# Patient Record
Sex: FEMALE | Race: WHITE | Employment: FULL TIME | URBAN - METROPOLITAN AREA
[De-identification: names, ages, dates, MRNs, and addresses within clinical notes are randomized per-mention and may not be internally consistent; named-entity substitution may affect disease eponyms.]

---

## 2019-07-01 ENCOUNTER — APPOINTMENT (OUTPATIENT)
Dept: GENERAL RADIOLOGY | Facility: CLINIC | Age: 24
End: 2019-07-01
Attending: EMERGENCY MEDICINE
Payer: COMMERCIAL

## 2019-07-01 ENCOUNTER — HOSPITAL ENCOUNTER (EMERGENCY)
Facility: CLINIC | Age: 24
Discharge: HOME | End: 2019-07-01
Attending: EMERGENCY MEDICINE | Admitting: EMERGENCY MEDICINE
Payer: COMMERCIAL

## 2019-07-01 VITALS
DIASTOLIC BLOOD PRESSURE: 89 MMHG | RESPIRATION RATE: 18 BRPM | SYSTOLIC BLOOD PRESSURE: 136 MMHG | BODY MASS INDEX: 31.39 KG/M2 | WEIGHT: 200 LBS | HEART RATE: 98 BPM | OXYGEN SATURATION: 98 % | HEIGHT: 67 IN | TEMPERATURE: 98.1 F

## 2019-07-01 DIAGNOSIS — S93.402A SPRAIN OF LEFT ANKLE, UNSPECIFIED LIGAMENT, INITIAL ENCOUNTER: ICD-10-CM

## 2019-07-01 PROCEDURE — 73610 X-RAY EXAM OF ANKLE: CPT | Mod: LT

## 2019-07-01 PROCEDURE — 99283 EMERGENCY DEPT VISIT LOW MDM: CPT

## 2019-07-01 PROCEDURE — 25000132 ZZH RX MED GY IP 250 OP 250 PS 637: Performed by: EMERGENCY MEDICINE

## 2019-07-01 RX ORDER — IBUPROFEN 600 MG/1
600 TABLET, FILM COATED ORAL ONCE
Status: COMPLETED | OUTPATIENT
Start: 2019-07-01 | End: 2019-07-01

## 2019-07-01 RX ADMIN — IBUPROFEN 600 MG: 600 TABLET ORAL at 09:06

## 2019-07-01 SDOH — HEALTH STABILITY: MENTAL HEALTH: HOW OFTEN DO YOU HAVE A DRINK CONTAINING ALCOHOL?: NEVER

## 2019-07-01 ASSESSMENT — ENCOUNTER SYMPTOMS: ARTHRALGIAS: 1

## 2019-07-01 ASSESSMENT — MIFFLIN-ST. JEOR: SCORE: 1694.82

## 2019-07-01 NOTE — ED PROVIDER NOTES
"  History     Chief Complaint:  Ankle Pain    HPI   Suzie Hill is a 23 year old female who presents with left ankle pain. The patient states that she was at [employer] yesterday, and fell on her ankle. She states that the ankle inverted and she has not been able to walk on it since. The patient denies pain to rest of her leg including her heel.     Allergies:  Amoxicillin  Penicillin    Medications:    Medications reviewed. No current medications.     Past Medical History:    Medical history reviewed. No pertinent medical history.    Past Surgical History:    Surgical history reviewed. No pertinent surgical history.    Family History:    Family history reviewed. No pertinent family history.     Social History:  Smoking Status: Never Smoker  Smokeless Tobacco: Never Used  Alcohol Use: no     Review of Systems   Musculoskeletal: Positive for arthralgias.   All other systems reviewed and are negative.      Physical Exam     Patient Vitals for the past 24 hrs:   BP Temp Temp src Pulse Resp SpO2 Height Weight   07/01/19 0850 136/89 98.1  F (36.7  C) Oral 98 18 98 % 1.702 m (5' 7\") 90.7 kg (200 lb)         Physical Exam  Constitutional: Alert, attentive, GCS 15  CV: 2+ DP and PT pulses, brisk distal cap refill  MSK: moderate swelling to the left ankle, bilateral malleolus tenderness   No tenderness to the midfoot, calcaneus, base of 5th metatarsal or proximal tib/fib  Neurological: 5/5 strength to the DF, PF, EHL and FHL motor functions; sensation intact to the DP, SP, T, S and S distributions  Skin: Skin is warm and dry.      Emergency Department Course     Imaging:  Radiology findings were communicated with the patient who voiced understanding of the findings.    XR ankle left G/E:  There is diffuse soft tissue swelling around the ankle. No  fracture or other osseous abnormality is seen. The joint spaces are  well preserved.   Reading per radiology    Interventions:  0906 Advil 600 mg Oral    Emergency Department " Course:     Nursing notes and vitals reviewed.    0926 The patient was sent for a XR ankle while in the emergency department, results above.     0943 I performed an exam of the patient as documented above. I personally reviewed the imaging results with the patient and answered all related questions prior to discharge.    Impression & Plan      Medical Decision Making:  This is a very pleasant 23 year old female who presented with a left ankle injury after jumping on a trampoline consistent with sprain, with no evidence of fracture on x-ray. There is no proximal tenderness or pain to suggest tib/fib or knee injury. There is no midfoot, calcaneus or base of 5th MT tenderness to suggest midfoot or calcaneus injury.  The patient was placed in an air splint and given norco with good pain relief. I have advised ibuprofen, ice, rest and elevation. The patient is only ambulatory with crutches, and will be given an air splint for eventual touch down weight bearing.  I advised strict return precautions for worse pain, swelling, numbness, or any other concerning symptoms, as well as follow-up with primary care in 3-5 days.        Diagnosis:    ICD-10-CM    1. Sprain of left ankle, unspecified ligament, initial encounter S93.402A        Disposition:   The patient is discharged to home.    Scribe Disclosure:  I, Flora Luna, am serving as a scribe at 9:44 AM on 7/1/2019 to document services personally performed by Rodrigo Anguiano MD based on my observations and the provider's statements to me.[facility] EMERGENCY DEPARTMENT       Rodrigo Anguiano MD  07/01/19 7509

## 2019-07-01 NOTE — ED TRIAGE NOTES
Pt was at [employer] last night, injuried right ankle. Unable to bear weight. Has an ace wrap on for support. Used ice last night and has taken aleve without relief.

## 2019-07-01 NOTE — ED NOTES
Pt ambulated in hallway with crutches and ankle brace independently. Gait steady. Pt felt comfortable ambulating with crutches. MD notified.

## 2019-07-01 NOTE — ED AVS SNAPSHOT
[facility] Emergency Department  [address]  [facility] [address]  Phone:  [phone]  Fax:  [phone]                                    Suzie Hill   MRN: [id number]    Department:  [facility] Emergency Department   Date of Visit:  7/1/2019           After Visit Summary Signature Page    I have received my discharge instructions, and my questions have been answered. I have discussed any challenges I see with this plan with the nurse or doctor.    ..........................................................................................................................................  Patient/Patient Representative Signature      ..........................................................................................................................................  Patient Representative Print Name and Relationship to Patient    ..................................................               ................................................  Date                                   Time    ..........................................................................................................................................  Reviewed by Signature/Title    ...................................................              ..............................................  Date                                               Time          22EPIC Rev 08/18

## 2019-09-02 ENCOUNTER — VIRTUAL VISIT (OUTPATIENT)
Dept: FAMILY MEDICINE | Facility: OTHER | Age: 24
End: 2019-09-02

## 2019-09-02 NOTE — PROGRESS NOTES
"Date:   Clinician: Ce Hernandes  Clinician NPI: [id number]  Patient: Suzie Hill  Patient : 1995  Patient Address: [address] [place] [address] [address], [place] [id number]  Patient Phone: [phone]  Visit Protocol: UTI  Patient Summary:  Suzie is a 23 year old ( : 1995 ) female who initiated a Visit for a presumed bladder infection. When asked the question \"Please sign me up to receive news, health information and promotions from [email or web address].\", Suzie responded \"No\".   Her symptoms started 1-3 days ago and consist of urinary frequency, urgency, urinary incontinence, vaginal itching, dysuria, foul-smelling urine, and feeling as if the bladder is never empty.   Symptom details   Urine color: The color of her urine is yellow.    Denied symptoms include chills, vaginal discharge, vomiting, nausea, flank pain, and abdominal pain. She does not feel feverish.   Over-the-counter medications or home remedies used to relieve the current symptoms as reported by the patient (free text): Azo   Precipitating events  Suzie denies having a sexually transmitted disease.  Pertinent medical history  Suzie has had a bladder infection before and has had 1 in the past 12 months. Her most recent bladder infection was not within the last 4 weeks. Her current symptoms are similar to her previous bladder infection symptoms.   She is not sure what antibiotics have been effective in treating her past bladder infections.   Suzie has not been prescribed antibiotics to prevent frequent or repeated bladder infections in the past and does not get yeast infections when she takes antibiotics. She has not experienced problems or side effects with any of the common antibiotics used to treat bladder infections.   Suzie does not have a history of kidney stones. She has not used a catheter or been a patient in a hospital or nursing home in the past 2 weeks.   Suzie does not smoke or use smokeless tobacco.   She denies pregnancy " and denies breastfeeding. She has menstruated in the past month.   Additional information as reported by the patient (free text): Last year when I went in for my bladder infection they said that I have bacterial vaginosis which will make me have bladder infections often. I haven't had one since last year though.     MEDICATIONS: No current medications, ALLERGIES: NKDA  Clinician Response:  Dear Suzie,  Based on the information you have provided, you likely have an acute urinary tract infection, also called a bladder infection. Bladder infections occur when bacteria from the outside of the body enters the urinary tract. Any part of the urinary system can be infected, but the bladder is the most common.  Medication information  I am prescribing:     Nitrofurantoin monohyd/m-cryst (Macrobid) 100 mg oral capsule. Take 1 capsule by mouth every 12 hours for 5 days. Take this medication with food. There are no refills with this prescription.   The medication I prescribed for your bladder infection is an antibiotic. Continue taking the medication until it is gone even if you feel better.   Yeast infections can be a common side effect of antibiotics. The most common symptom of a yeast infection is itchiness in and around the vagina. Other signs and symptoms include burning, redness, or a thick, white vaginal discharge that looks like cottage cheese and does not have a bad smell.  Self care  Urination helps to flush bacteria from the urinary tract. For this reason, drinking water and urinating often helps relieve some urinary symptoms and can decrease your risk of getting bladder infections in the future.  Other steps you can take to prevent future bladder infections include:     Wipe front to back after using the bathroom    Urinate after sexual intercourse    Avoid using deodorant sprays, douches, or powders in the vaginal area     When to seek care  Please make an appointment to be seen in a clinic or urgent care if any  of the following occur:     You develop new symptoms or your symptoms become worse    You have medication side effects that make it difficult to take them as prescribed    Your symptoms do not improve within 1-2 days of starting treatment    You have symptoms of a bladder infection that return shortly after completing treatment     It is possible to have an allergic reaction to an antibiotic even if you have not had one in the past. If you notice a new rash, significant swelling, or difficulty breathing, stop taking this medication immediately and go to a clinic or urgent care.   Diagnosis: Acute uncomplicated bladder infection  Diagnosis ICD: N39.0  Prescription: nitrofurantoin monohyd/m-cryst (Macrobid) 100 mg oral capsule 10 capsule, 5 days supply. Take 1 capsule by mouth every 12 hours for 5 days. Refills: 0, Refill as needed: no, Allow substitutions: yes  Pharmacy: [facility] #[id number] - [phone] - [address], [place] [address]

## 2019-12-12 ENCOUNTER — VIRTUAL VISIT (OUTPATIENT)
Dept: FAMILY MEDICINE | Facility: OTHER | Age: 24
End: 2019-12-12

## 2019-12-13 NOTE — PROGRESS NOTES
"Date: 2019 18:05:38  Clinician: Margie Obrien  Clinician NPI: [id number]  Patient: Suzie Hill  Patient : 1995  Patient Address: [address] [place] [address] [address], [place] [address]  Patient Phone: [phone]  Visit Protocol: URI  Patient Summary:  Suzie is a 23 year old ( : 1995 ) female who initiated a Visit for cold, sinus infection, or influenza. When asked the question \"Please sign me up to receive news, health information and promotions from [employer].\", Suzie responded \"No\".    Suzie states her symptoms started gradually 3-6 days ago. After her symptoms started, they improved and then got worse again.   Her symptoms consist of a headache, a sore throat, enlarged lymph nodes, nasal congestion, rhinitis, tooth pain, a cough, facial pain or pressure, and myalgia. She is experiencing mild difficulty breathing with activities but can speak normally in full sentences.   Symptom details     Nasal secretions: The color of her mucus is yellow.    Cough: Suzie coughs almost every minute and her cough is more bothersome at night. Phlegm comes into her throat when she coughs. She does not believe the phlegm causes the cough. The color of the phlegm is yellow.     Sore throat: Suzie reports having moderate throat pain (4-6 on a 10 point pain scale), does not have exudate on her tonsils, and can swallow liquids. The lymph nodes in her neck are enlarged. A rash has not appeared on the skin since the sore throat started.     Facial pain or pressure: The facial pain or pressure feels worse when bending over or leaning forward.     Headache: She states the headache is severe (7-9 on a 10 point pain scale).     Tooth pain: The tooth pain is not caused by a cavity, recent dental work, or other mouth problems.      Suzie denies having ear pain, chills, malaise, fever, and wheezing. She also denies having recent facial or sinus surgery in the past 60 days and having a sinus infection within the past " year.   Precipitating events  Within the past week, Suzie has not been exposed to someone with strep throat. She has not recently been exposed to someone with influenza. Suzie has not been in close contact with any high risk individuals.   Pertinent medical history  Suzie has taken an antibiotic medication in the past month. Antibiotic details as reported by the patient (free text): I came in with a tight chest and cough. They tested me for an ammonia but it was negative so they put me on an antibiotic. I seemed to get better. But then I feel like I got worse   Suzie does not get yeast infections when she takes antibiotics.   Weight: 230 lbs   Suzie does not smoke or use smokeless tobacco.   She denies pregnancy and denies breastfeeding. She has menstruated in the past month.   Additional information as reported by the patient (free text): My cough is very dry and short. Mostly dealing with tons of pressure in sinus area. Aches and pains in jaw and neck. Constant headache. Ibuprofen hasn't been doing much.   Weight: 230 lbs    MEDICATIONS: No current medications, ALLERGIES: Penicillins  Clinician Response:  Dear Suzie,  I am sorry you are not feeling well. To determine the most appropriate care for you, I would like you to be seen in person to further discuss your health history and symptoms.  You will not be charged for this Visit. Thank you for trusting us with your care.   Diagnosis: Refer for additional evaluation  Diagnosis ICD: R69

## 2020-03-11 ENCOUNTER — HEALTH MAINTENANCE LETTER (OUTPATIENT)
Age: 25
End: 2020-03-11

## 2021-01-03 ENCOUNTER — HEALTH MAINTENANCE LETTER (OUTPATIENT)
Age: 26
End: 2021-01-03

## 2021-04-25 ENCOUNTER — HEALTH MAINTENANCE LETTER (OUTPATIENT)
Age: 26
End: 2021-04-25

## 2021-10-10 ENCOUNTER — HEALTH MAINTENANCE LETTER (OUTPATIENT)
Age: 26
End: 2021-10-10

## 2022-05-21 ENCOUNTER — HEALTH MAINTENANCE LETTER (OUTPATIENT)
Age: 27
End: 2022-05-21

## 2022-09-18 ENCOUNTER — HEALTH MAINTENANCE LETTER (OUTPATIENT)
Age: 27
End: 2022-09-18

## 2023-06-04 ENCOUNTER — HEALTH MAINTENANCE LETTER (OUTPATIENT)
Age: 28
End: 2023-06-04